# Patient Record
Sex: FEMALE | Race: WHITE | NOT HISPANIC OR LATINO | Employment: FULL TIME | URBAN - METROPOLITAN AREA
[De-identification: names, ages, dates, MRNs, and addresses within clinical notes are randomized per-mention and may not be internally consistent; named-entity substitution may affect disease eponyms.]

---

## 2021-12-31 ENCOUNTER — OFFICE VISIT (OUTPATIENT)
Dept: URGENT CARE | Facility: URGENT CARE | Age: 31
End: 2021-12-31
Payer: COMMERCIAL

## 2021-12-31 VITALS
RESPIRATION RATE: 14 BRPM | HEART RATE: 98 BPM | TEMPERATURE: 98.1 F | SYSTOLIC BLOOD PRESSURE: 104 MMHG | DIASTOLIC BLOOD PRESSURE: 62 MMHG | OXYGEN SATURATION: 98 %

## 2021-12-31 DIAGNOSIS — J45.901 EXACERBATION OF ASTHMA, UNSPECIFIED ASTHMA SEVERITY, UNSPECIFIED WHETHER PERSISTENT: ICD-10-CM

## 2021-12-31 DIAGNOSIS — J06.9 VIRAL URI WITH COUGH: Primary | ICD-10-CM

## 2021-12-31 DIAGNOSIS — R05.9 COUGH: ICD-10-CM

## 2021-12-31 DIAGNOSIS — R06.02 SHORTNESS OF BREATH: ICD-10-CM

## 2021-12-31 DIAGNOSIS — R06.2 WHEEZING: ICD-10-CM

## 2021-12-31 DIAGNOSIS — R09.81 NASAL CONGESTION: ICD-10-CM

## 2021-12-31 LAB
FLUAV AG SPEC QL IA: NEGATIVE
FLUBV AG SPEC QL IA: NEGATIVE

## 2021-12-31 PROCEDURE — 99204 OFFICE O/P NEW MOD 45 MIN: CPT | Performed by: PHYSICIAN ASSISTANT

## 2021-12-31 PROCEDURE — 87804 INFLUENZA ASSAY W/OPTIC: CPT | Performed by: PHYSICIAN ASSISTANT

## 2021-12-31 PROCEDURE — U0005 INFEC AGEN DETEC AMPLI PROBE: HCPCS | Performed by: PHYSICIAN ASSISTANT

## 2021-12-31 PROCEDURE — U0003 INFECTIOUS AGENT DETECTION BY NUCLEIC ACID (DNA OR RNA); SEVERE ACUTE RESPIRATORY SYNDROME CORONAVIRUS 2 (SARS-COV-2) (CORONAVIRUS DISEASE [COVID-19]), AMPLIFIED PROBE TECHNIQUE, MAKING USE OF HIGH THROUGHPUT TECHNOLOGIES AS DESCRIBED BY CMS-2020-01-R: HCPCS | Performed by: PHYSICIAN ASSISTANT

## 2021-12-31 RX ORDER — ALBUTEROL SULFATE 90 UG/1
1-2 AEROSOL, METERED RESPIRATORY (INHALATION)
COMMUNITY
Start: 2021-08-03

## 2021-12-31 RX ORDER — ALBUTEROL SULFATE 0.83 MG/ML
2.5 SOLUTION RESPIRATORY (INHALATION)
COMMUNITY
Start: 2021-08-03 | End: 2022-08-03

## 2021-12-31 RX ORDER — TOPIRAMATE 25 MG/1
75 TABLET, FILM COATED ORAL
COMMUNITY
Start: 2021-01-25 | End: 2022-01-25

## 2021-12-31 RX ORDER — PREDNISONE 20 MG/1
40 TABLET ORAL DAILY
Qty: 10 TABLET | Refills: 0 | Status: SHIPPED | OUTPATIENT
Start: 2021-12-31 | End: 2022-01-05

## 2021-12-31 RX ORDER — LEVONORGESTREL AND ETHINYL ESTRADIOL 0.15-0.03
1 KIT ORAL DAILY
COMMUNITY
Start: 2021-10-19

## 2021-12-31 RX ORDER — TRAZODONE HYDROCHLORIDE 100 MG/1
TABLET ORAL
COMMUNITY
Start: 2021-06-09

## 2021-12-31 NOTE — PATIENT INSTRUCTIONS
Patient Education     Viral Upper Respiratory Illness with Wheezing (Adult)    You have a viral upper respiratory illness (URI), which is another term for the common cold. When the infection causes a lot of irritation, the air passages can go into spasm. This causes wheezing and shortness of breath.  This illness is contagious during the first few days. It is spread through the air by coughing and sneezing. It may also be spread by direct contact. This could be by touching the sick person and then touching your own eyes, nose, or mouth. Frequent handwashing will decrease the risk.  Most viral illnesses go away within 7 to 10 days with rest and simple home remedies. Sometimes the illness may last for several weeks. Antibiotics will not kill a virus, and they are generally not prescribed for this condition.  Home care    If symptoms are severe, rest at home for the first 2 to 3 days. When you resume activity, don't let yourself get too tired.    If you smoke, stop. Ask your healthcare provider if you need help.    Stay away from secondhand cigarette smoke. Don't let people smoke in your house or car.    You may use acetaminophen or ibuprofen to control pain and fever, unless another medicine was prescribed. Take the medicine only as directed on the label. If you have chronic liver or kidney disease, have ever had a stomach ulcer or gastrointestinal bleeding, or are taking blood-thinning medicines, talk with your healthcare provider before using these medicines. Aspirin should never be given to anyone under 18 years of age who is ill with a viral infection or fever. It may cause severe liver or brain damage.    Your appetite may be poor, so a light diet is fine. Stay well hydrated by drinking 6 to 8 glasses of fluids per day (water, soft drinks, juices, tea, or soup). Extra fluids will help loosen secretions in the nose and lungs.    Over-the-counter cold medicines will not shorten the length of time you re sick, but  they may be helpful for the following symptoms: cough, sore throat, and nasal and sinus congestion. Ask your healthcare provider or pharmacist which over-the-counter medicine to use. Don't use decongestants if you have high blood pressure.  Follow-up care  Follow up with your healthcare provider, or as advised.  When to seek medical advice  Call your healthcare provider right away if any of these occur:    Cough with lots of colored sputum (mucus)    Severe headache; face, neck, or ear pain    Difficulty swallowing due to throat pain    Fever of 100.4 F (38 C) or higher, or as directed by your healthcare provider  Call 911  Call 911 if any of these occur:    Chest pain, shortness of breath, worsening wheezing, or difficulty breathing    Coughing up blood    Very severe pain when swallowing, especially if it goes along with a muffled voice  Dannielle last reviewed this educational content on 6/1/2018 2000-2021 [employer]. All rights reserved. This information is not intended as a substitute for professional medical care. Always follow your healthcare professional's instructions.                     December 31, 2021 [facility] Plan:      You have been diagnosed with a viral upper respiratory infection and asthma exacerbation.       Your Influenza A and B screening test result was negative/normal here today.     You may have a common cold virus. It is also possible you could have a Covid-19 virus.      A Covid-19 PCR test was done here today.  The result typically comes back in 1-2 days (watch your MyChart or call the urgent care clinic for your result in 1-2 days).     I prescribed a refill of your daily asthma prevention mediation (Advair) for you today. I also prescribed a 5 day course of prednisone for your asthma exacerbation. You should continue to use your Albuterol Nebs or hand held Albuterol MDI.     If your symptoms do not improve over the next 24-48 hours with treatment provided here  today, you should be re-evaluated by your primary care provider. If your symptoms worsen over the holiday weekend, go to the emergency room.        Please continue with home comfort care measures (including as needed Tylenol or Ibuprofen for sore throat and fever) and extra rest and fluids.     Follow-up immediately if you have any sudden, severe, worsening of your symptoms or if you develop any of the below:         Chest pain, shortness of breath, wheezing, or difficulty breathing    Coughing up blood    Very severe pain with swallowing, especially if it goes along with a muffled voice        Please stay home/self-isolate (no contact with others outside of home) until your Covid-19 test result is back (this typically takes 1-2 days), you are without fever for 24 hours (without use of fever reducing medication) and your symptoms are improving.       Please contact your work supervisor regarding their return to work policy when you have acute illness symptoms and you have had a Covid-19 test (with result pending).

## 2021-12-31 NOTE — PROGRESS NOTES
ASSESSMENT/PLAN:         (J06.9) Viral URI with cough  (primary encounter diagnosis)    MDM: Acute onset viral URI with associated asthma exacerbation/mild respiratory distress. Influenza screening negative. Covid-19 PCR pending. No evidence of secondary bacterial infection. No evidence of severe respiratory distress (good response to Albuterol MDI during visit) or other medical distress requiring immediate ER evaluation or hospitalization on history and exam here today. I have advised low threshold for immediate follow-up if any worsening of current symptoms and short interval follow-up if no improvement in next 24-48 hours. Please see below patient discharge summary (which I reviewed with her personally and provided in printed form for home review).     Plan:         December 31, 2021 [facility] Plan:      You have been diagnosed with a viral upper respiratory infection and asthma exacerbation.       Your Influenza A and B screening test result was negative/normal here today.     You may have a common cold virus. It is also possible you could have a Covid-19 virus.      A Covid-19 PCR test was done here today.  The result typically comes back in 1-2 days (watch your MyChart or call the urgent care clinic for your result in 1-2 days).     I prescribed a refill of your daily asthma prevention mediation (Advair) for you today. I also prescribed a 5 day course of prednisone for your asthma exacerbation. You should continue to use your Albuterol Nebs or hand held Albuterol MDI.     If your symptoms do not improve over the next 24-48 hours with treatment provided here today, you should be re-evaluated by your primary care provider. If your symptoms worsen over the holiday weekend, go to the emergency room.        Please continue with home comfort care measures (including as needed Tylenol or Ibuprofen for sore throat and fever) and extra rest and fluids.     Follow-up immediately if you have any sudden,  severe, worsening of your symptoms or if you develop any of the below:         Chest pain, shortness of breath, wheezing, or difficulty breathing    Coughing up blood    Very severe pain with swallowing, especially if it goes along with a muffled voice        Please stay home/self-isolate (no contact with others outside of home) until your Covid-19 test result is back (this typically takes 1-2 days), you are without fever for 24 hours (without use of fever reducing medication) and your symptoms are improving.       Please contact your work supervisor regarding their return to work policy when you have acute illness symptoms and you have had a Covid-19 test (with result pending).       (J45.905) Exacerbation of asthma, unspecified asthma severity, unspecified whether persistent  Plan: predniSONE (DELTASONE) 20 MG tablet, ADVAIR         DISKUS 100-50 MCG/DOSE inhaler    (R09.81) Nasal congestion  Plan: Influenza A & B Antigen - Clinic Collect    (R05.9) Cough    (R06.2) Wheezing    (R06.02) Shortness of breath    ---------------------------------------------        SUBJECTIVE:     Aliza Montgomery a very pleasant 31 year old female, with a history of asthma, presenting to  today for evaluation of acute onset cough, nasal congestion x several days, followed by  exacerbation of baseline asthma today. Patient states she has been using her Albuterol rescue nebs (states nebs tend to work better for her during exacerbation), but only gets short interval relief (estimated about 30-60 minutes of relief).     No severe shortness of breath (still able to do all ADL's). No coughing up of blood. No blue lips, fingers or toes. No history of severe asthma exacerbations requiring hospitalizations. Patient states she has needed prednisone in past when she has felt similar to how she feels now.        Illness Contact: No known close contact Influenza,  Covid-19 or other illness exposure         ROS: No associated fever,  chills, cough, shortness of breath, wheezing, sore throat, abdominal pain, nausea, vomiting, diarrhea, body aches, headaches, rashes, joint swelling or other acute illness symptoms.     There is no problem list on file for this patient.      Current Outpatient Medications   Medication     ADVAIR DISKUS 100-50 MCG/DOSE inhaler     albuterol (PROAIR HFA/PROVENTIL HFA/VENTOLIN HFA) 108 (90 Base) MCG/ACT inhaler     albuterol (PROVENTIL) (2.5 MG/3ML) 0.083% neb solution     etonogestrel (NEXPLANON) 68 MG IMPL     levonorgestrel-ethinyl estradiol (SEASONALE) 0.15-0.03 MG tablet     topiramate (TOPAMAX) 25 MG tablet     traZODone (DESYREL) 100 MG tablet     No current facility-administered medications for this visit.         Allergies   Allergen Reactions     Bacitracin-Neomycin-Polymyxin      PN: LW Reaction: RASH     Hydrocortisone      Patient states she tolerates oral prednisone for asthma exacerbations without side effects.      Sulfa Drugs      PN: LW Reaction: RASH         OBJECTIVE:  /62   Pulse 98   Temp 98.1  F (36.7  C) (Tympanic)   Resp 14   SpO2 98%   Breastfeeding No          General appearance: alert and no apparent distress  Skin color is pink and without rash.  HEENT:   Conjunctiva not injected.  Sclera clear.  Left TM is normal: no effusions, no erythema, and normal landmarks.  Right TM is normal: no effusions, no erythema, and normal landmarks.  Nasal mucosa is congested  Oropharyngeal exam is positive for post-nasal drip. No erythema.  No asymmetry. Uvula is midline. No trismus. Voice is clear. No lesions, adenopathy, plaque or exudate.  Neck is supple, FROM. No neck stiffness. No adenopathy  CARDIAC:NORMAL - regular rate and rhythm without murmur.  RESP: Positive for increased work of breathing. Initially, patient has to pause when talking. I directed her to use 2 Puffs of her own Albuterol Inhaler while in clinic, followed by 2 more puffs 15 minutes later, and increased work of breathing  at rest resolved. Positive for scattered wheezes. No rales or rhonchi. Still moving air well into all listening areas bilaterally.   NEURO: Alert and oriented.  Normal speech and mentation.  CN II/XII grossly intact.  Gait within normal limits.          LAB:      Results for orders placed or performed in visit on 12/31/21   Influenza A/B antigen     Status: Normal    Specimen: Nasopharyngeal; Swab   Result Value Ref Range    Influenza A antigen Negative Negative    Influenza B antigen Negative Negative    Narrative    Test results must be correlated with clinical data. If necessary, results should be confirmed by a molecular assay or viral culture.          Covid-19 PCR Test Result is Pending

## 2022-01-01 LAB — SARS-COV-2 RNA RESP QL NAA+PROBE: NEGATIVE

## 2022-11-12 ENCOUNTER — OFFICE VISIT (OUTPATIENT)
Dept: URGENT CARE | Facility: URGENT CARE | Age: 32
End: 2022-11-12
Payer: COMMERCIAL

## 2022-11-12 VITALS
DIASTOLIC BLOOD PRESSURE: 83 MMHG | WEIGHT: 234 LBS | HEART RATE: 78 BPM | TEMPERATURE: 98.7 F | OXYGEN SATURATION: 99 % | SYSTOLIC BLOOD PRESSURE: 122 MMHG

## 2022-11-12 DIAGNOSIS — J06.9 VIRAL URI WITH COUGH: Primary | ICD-10-CM

## 2022-11-12 DIAGNOSIS — R05.1 ACUTE COUGH: ICD-10-CM

## 2022-11-12 DIAGNOSIS — R07.0 THROAT PAIN: ICD-10-CM

## 2022-11-12 LAB
DEPRECATED S PYO AG THROAT QL EIA: NEGATIVE
FLUAV AG SPEC QL IA: NEGATIVE
FLUBV AG SPEC QL IA: NEGATIVE

## 2022-11-12 PROCEDURE — 99213 OFFICE O/P EST LOW 20 MIN: CPT | Performed by: PHYSICIAN ASSISTANT

## 2022-11-12 PROCEDURE — 87804 INFLUENZA ASSAY W/OPTIC: CPT | Performed by: PHYSICIAN ASSISTANT

## 2022-11-12 PROCEDURE — 87651 STREP A DNA AMP PROBE: CPT | Performed by: PHYSICIAN ASSISTANT

## 2022-11-12 RX ORDER — BENZONATATE 200 MG/1
200 CAPSULE ORAL 3 TIMES DAILY PRN
Qty: 30 CAPSULE | Refills: 0 | Status: SHIPPED | OUTPATIENT
Start: 2022-11-12

## 2022-11-12 NOTE — PROGRESS NOTES
Assessment & Plan     Acute cough  - Influenza A & B Antigen - Clinic Collect    Throat pain  - Streptococcus A Rapid Screen w/Reflex to PCR - Clinic Collect  - Group A Streptococcus PCR Throat Swab    Viral URI with cough  - benzonatate (TESSALON) 200 MG capsule  Dispense: 30 capsule; Refill: 0       Pt presents with cough, congestion, shortness of breath and nausea that began four days ago. Influenza and strep tests are negative, pt has taken numerous home COVID tests that have also been negative. Pt has asthma, is currently using advair inhaler but not albuterol inhaler. Pt will begin tessalon for cough and utilize albuterol nebulizer and inhaler for shortness of breath. Pt reports occasional wheezing at night, wheezes not appreciated on exam. Pt does not feel that she needs prednisone course at this time. She will follow up if shortness of breath increases or symptoms worsen.       Saira Phillips PA-C  [facility] Abrams is a 32 year old female who presents to clinic today for the following health issues:  Chief Complaint   Patient presents with     Shortness of Breath     Cough     Started on Tuesday     Headache     Nasal Congestion     HPI    URI Adult    Onset of symptoms was 4 day(s) ago.  Course of illness is waxing and waning.    Severity moderate  Current and Associated symptoms: chills, stuffy nose, cough - non-productive, shortness of breath, sore throat, headache, fatigue, nausea, vomiting, body aches and reflux  Treatment measures tried include Tylenol/Ibuprofen, Fluids and Rest.  Predisposing factors include HX of asthma.  Has been using advair inhaler, has albuterol inhaler but is not using it   Notices wheezing when laying down       No past medical history on file.  Current Outpatient Medications   Medication     benzonatate (TESSALON) 200 MG capsule     ADVAIR DISKUS 100-50 MCG/DOSE inhaler     albuterol (PROAIR HFA/PROVENTIL HFA/VENTOLIN HFA)  108 (90 Base) MCG/ACT inhaler     albuterol (PROVENTIL) (2.5 MG/3ML) 0.083% neb solution     etonogestrel (NEXPLANON) 68 MG IMPL     levonorgestrel-ethinyl estradiol (SEASONALE) 0.15-0.03 MG tablet     topiramate (TOPAMAX) 25 MG tablet     traZODone (DESYREL) 100 MG tablet     No current facility-administered medications for this visit.     Social History     Socioeconomic History     Marital status: Single     Spouse name: Not on file     Number of children: Not on file     Years of education: Not on file     Highest education level: Not on file   Occupational History     Not on file   Tobacco Use     Smoking status: Every Day     Smokeless tobacco: Never   Substance and Sexual Activity     Alcohol use: Not on file     Drug use: Not on file     Sexual activity: Not on file   Other Topics Concern     Not on file   Social History Narrative     Not on file     Social Determinants of Health     Financial Resource Strain: Not on file   Food Insecurity: Not on file   Transportation Needs: Not on file   Physical Activity: Not on file   Stress: Not on file   Social Connections: Not on file   Intimate Partner Violence: Not on file   Housing Stability: Not on file         Review of Systems  Detailed as above       Objective    /83   Pulse 78   Temp 98.7  F (37.1  C) (Oral)   Wt 106.1 kg (234 lb)   SpO2 99%   Physical Exam  HENT:      Right Ear: Tympanic membrane normal.      Left Ear: Tympanic membrane normal.      Nose: Congestion present.      Right Sinus: No maxillary sinus tenderness or frontal sinus tenderness.      Left Sinus: No maxillary sinus tenderness or frontal sinus tenderness.      Mouth/Throat:      Pharynx: Posterior oropharyngeal erythema present. No oropharyngeal exudate.   Cardiovascular:      Rate and Rhythm: Normal rate and regular rhythm.      Heart sounds: Normal heart sounds.   Pulmonary:      Effort: Pulmonary effort is normal.      Breath sounds: Normal breath sounds. No wheezing.    Musculoskeletal:         General: Normal range of motion.   Skin:     General: Skin is warm and dry.   Neurological:      Mental Status: She is alert.   Psychiatric:         Mood and Affect: Mood normal.        Results for orders placed or performed in visit on 11/12/22   Influenza A & B Antigen - Clinic Collect     Status: Normal    Specimen: Nasopharyngeal; Swab   Result Value Ref Range    Influenza A antigen Negative Negative    Influenza B antigen Negative Negative    Narrative    Test results must be correlated with clinical data. If necessary, results should be confirmed by a molecular assay or viral culture.   Streptococcus A Rapid Screen w/Reflex to PCR - Clinic Collect     Status: Normal    Specimen: Throat; Swab   Result Value Ref Range    Group A Strep antigen Negative Negative

## 2022-11-13 LAB — GROUP A STREP BY PCR: NOT DETECTED

## 2022-12-09 ENCOUNTER — OFFICE VISIT (OUTPATIENT)
Dept: URGENT CARE | Facility: URGENT CARE | Age: 32
End: 2022-12-09
Payer: COMMERCIAL

## 2022-12-09 VITALS
OXYGEN SATURATION: 96 % | TEMPERATURE: 98 F | SYSTOLIC BLOOD PRESSURE: 124 MMHG | HEART RATE: 74 BPM | DIASTOLIC BLOOD PRESSURE: 80 MMHG | RESPIRATION RATE: 18 BRPM

## 2022-12-09 DIAGNOSIS — J45.901 MODERATE ASTHMA WITH ACUTE EXACERBATION, UNSPECIFIED WHETHER PERSISTENT: Primary | ICD-10-CM

## 2022-12-09 PROCEDURE — 99214 OFFICE O/P EST MOD 30 MIN: CPT | Performed by: PHYSICIAN ASSISTANT

## 2022-12-09 RX ORDER — PREDNISONE 20 MG/1
TABLET ORAL
COMMUNITY
Start: 2022-12-05

## 2022-12-09 RX ORDER — AZITHROMYCIN 250 MG/1
TABLET, FILM COATED ORAL
Qty: 6 TABLET | Refills: 0 | Status: SHIPPED | OUTPATIENT
Start: 2022-12-09

## 2022-12-09 RX ORDER — PREDNISONE 20 MG/1
40 TABLET ORAL DAILY
Qty: 4 TABLET | Refills: 0 | Status: SHIPPED | OUTPATIENT
Start: 2022-12-09 | End: 2022-12-11

## 2022-12-09 RX ORDER — FLUTICASONE PROPIONATE AND SALMETEROL 100; 50 UG/1; UG/1
1 POWDER RESPIRATORY (INHALATION) 2 TIMES DAILY
Qty: 14 EACH | Refills: 0 | Status: SHIPPED | OUTPATIENT
Start: 2022-12-09 | End: 2022-12-14

## 2022-12-09 RX ORDER — ACETAMINOPHEN AND CODEINE PHOSPHATE 120; 12 MG/5ML; MG/5ML
SOLUTION ORAL
COMMUNITY
Start: 2022-10-14

## 2022-12-09 NOTE — PROGRESS NOTES
Assessment & Plan     1. Moderate asthma with acute exacerbation, unspecified whether persistent  Will add on azithromycin for asthma exacerbation  Continue with prednisone burst, if still having tightness in the chest, extend for 2 more days.  Refill of advair given  Supportive cares discussed   Discussed indications for follow-up   - fluticasone-salmeterol (ADVAIR) 100-50 MCG/ACT inhaler; Inhale 1 puff into the lungs 2 times daily  Dispense: 14 each; Refill: 0  - azithromycin (ZITHROMAX) 250 MG tablet; Two tablets first day, then one tablet daily for four days.  Dispense: 6 tablet; Refill: 0  - predniSONE (DELTASONE) 20 MG tablet; Take 2 tablets (40 mg) by mouth daily for 2 days  Dispense: 4 tablet; Refill: 0        Return in about 3 days (around 12/12/2022), or if symptoms worsen or fail to improve.    Diagnosis and treatment plan was reviewed with patient and/or family.   We went over any labs or imaging. Discussed worsening symptoms or little to no relief despite treatment plan to follow-up with PCP or return to clinic.  Patient verbalizes understanding. All questions were addressed and answered.     Cristel Gordillo PA-C  [facility]    CHIEF COMPLAINT:   Chief Complaint   Patient presents with     Cough     Cough/wheezing pt has asthma      Subjective     Aliza is a 32 year old female who presents to clinic today for evaluation of asthma exacerbation. Started 5 days ago. Started on prednisone burst 4 days ago. Not seeing much improvement. Using her albuterol inhaler. No chest pain.   At home covid test negative.       History reviewed. No pertinent past medical history.  History reviewed. No pertinent surgical history.  Social History     Tobacco Use     Smoking status: Every Day     Smokeless tobacco: Never   Substance Use Topics     Alcohol use: Not on file     Current Outpatient Medications   Medication     albuterol (PROAIR HFA/PROVENTIL HFA/VENTOLIN HFA) 108 (90 Base)  MCG/ACT inhaler     azithromycin (ZITHROMAX) 250 MG tablet     benzonatate (TESSALON) 200 MG capsule     etonogestrel (NEXPLANON) 68 MG IMPL     fluticasone-salmeterol (ADVAIR) 100-50 MCG/ACT inhaler     levonorgestrel-ethinyl estradiol (SEASONALE) 0.15-0.03 MG tablet     predniSONE (DELTASONE) 20 MG tablet     traZODone (DESYREL) 100 MG tablet     ADVAIR DISKUS 100-50 MCG/DOSE inhaler     albuterol (PROVENTIL) (2.5 MG/3ML) 0.083% neb solution     norethindrone (MICRONOR) 0.35 MG tablet     predniSONE (DELTASONE) 20 MG tablet     topiramate (TOPAMAX) 25 MG tablet     No current facility-administered medications for this visit.     Allergies   Allergen Reactions     Bacitracin-Neomycin-Polymyxin      PN: LW Reaction: RASH     Hydrocortisone      Patient states she tolerates oral prednisone for asthma exacerbations without side effects.      Sulfa Drugs      PN: LW Reaction: RASH       10 point ROS of systems were all negative except for pertinent positives noted in my HPI.      Exam:   /80   Pulse 74   Temp 98  F (36.7  C)   Resp 18   SpO2 96%   Constitutional: healthy, alert and no distress  Head: Normocephalic, atraumatic.  Eyes: conjunctiva clear, no drainage  ENT: TMs clear and shiny tracie, nasal mucosa pink and moist, throat without tonsillar hypertrophy or erythema  Neck: neck is supple, no cervical lymphadenopathy or nuchal rigidity  Cardiovascular: RRR  Respiratory: Faint end expiratory wheezing.   Skin: no rashes  Neurologic: Speech clear, gait normal. Moves all extremities.    No results found for any visits on 12/09/22.

## 2022-12-09 NOTE — PATIENT INSTRUCTIONS
Start taking azithromycin  If you are still having chest tightness on Sun, OK to continue prednisone burst for 2 more days  Continue with albuterol  Use advair to prevent asthma exacerbations in the future  Mucinex for cough  Fluids, rest and humidified air at night

## 2025-07-07 ENCOUNTER — HOSPITAL ENCOUNTER (EMERGENCY)
Facility: CLINIC | Age: 35
Discharge: HOME | End: 2025-07-08
Payer: COMMERCIAL

## 2025-07-07 ENCOUNTER — APPOINTMENT (OUTPATIENT)
Dept: CT IMAGING | Facility: CLINIC | Age: 35
End: 2025-07-07
Payer: COMMERCIAL

## 2025-07-07 DIAGNOSIS — R13.10 DYSPHAGIA, UNSPECIFIED TYPE: ICD-10-CM

## 2025-07-07 DIAGNOSIS — R09.89 THROAT TIGHTNESS: ICD-10-CM

## 2025-07-07 LAB
ANION GAP SERPL CALCULATED.3IONS-SCNC: 14 MMOL/L (ref 7–15)
ATRIAL RATE - MUSE: 55 BPM
BASOPHILS # BLD AUTO: 0.1 10E3/UL (ref 0–0.2)
BASOPHILS NFR BLD AUTO: 1 %
BUN SERPL-MCNC: 6.7 MG/DL (ref 6–20)
CALCIUM SERPL-MCNC: 9.6 MG/DL (ref 8.8–10.4)
CHLORIDE SERPL-SCNC: 102 MMOL/L (ref 98–107)
CREAT SERPL-MCNC: 0.97 MG/DL (ref 0.51–0.95)
DIASTOLIC BLOOD PRESSURE - MUSE: NORMAL MMHG
EGFRCR SERPLBLD CKD-EPI 2021: 78 ML/MIN/1.73M2
EOSINOPHIL # BLD AUTO: 0.4 10E3/UL (ref 0–0.7)
EOSINOPHIL NFR BLD AUTO: 3 %
ERYTHROCYTE [DISTWIDTH] IN BLOOD BY AUTOMATED COUNT: 13.2 % (ref 10–15)
GLUCOSE SERPL-MCNC: 89 MG/DL (ref 70–99)
HCG SERPL QL: NEGATIVE
HCO3 SERPL-SCNC: 23 MMOL/L (ref 22–29)
HCT VFR BLD AUTO: 42.8 % (ref 35–47)
HGB BLD-MCNC: 14.4 G/DL (ref 11.7–15.7)
IMM GRANULOCYTES # BLD: 0.1 10E3/UL
IMM GRANULOCYTES NFR BLD: 1 %
INTERPRETATION ECG - MUSE: NORMAL
LYMPHOCYTES # BLD AUTO: 2.7 10E3/UL (ref 0.8–5.3)
LYMPHOCYTES NFR BLD AUTO: 21 %
MCH RBC QN AUTO: 30.2 PG (ref 26.5–33)
MCHC RBC AUTO-ENTMCNC: 33.6 G/DL (ref 31.5–36.5)
MCV RBC AUTO: 90 FL (ref 78–100)
MONOCYTES # BLD AUTO: 0.7 10E3/UL (ref 0–1.3)
MONOCYTES NFR BLD AUTO: 6 %
NEUTROPHILS # BLD AUTO: 8.8 10E3/UL (ref 1.6–8.3)
NEUTROPHILS NFR BLD AUTO: 69 %
NRBC # BLD AUTO: 0 10E3/UL
NRBC BLD AUTO-RTO: 0 /100
P AXIS - MUSE: 66 DEGREES
PLATELET # BLD AUTO: 307 10E3/UL (ref 150–450)
POTASSIUM SERPL-SCNC: 3.5 MMOL/L (ref 3.4–5.3)
PR INTERVAL - MUSE: 170 MS
QRS DURATION - MUSE: 88 MS
QT - MUSE: 414 MS
QTC - MUSE: 396 MS
R AXIS - MUSE: 59 DEGREES
RBC # BLD AUTO: 4.77 10E6/UL (ref 3.8–5.2)
SODIUM SERPL-SCNC: 139 MMOL/L (ref 135–145)
SYSTOLIC BLOOD PRESSURE - MUSE: NORMAL MMHG
T AXIS - MUSE: 55 DEGREES
VENTRICULAR RATE- MUSE: 55 BPM
WBC # BLD AUTO: 12.8 10E3/UL (ref 4–11)

## 2025-07-07 PROCEDURE — 36415 COLL VENOUS BLD VENIPUNCTURE: CPT

## 2025-07-07 PROCEDURE — 93005 ELECTROCARDIOGRAM TRACING: CPT

## 2025-07-07 PROCEDURE — 70491 CT SOFT TISSUE NECK W/DYE: CPT

## 2025-07-07 PROCEDURE — 85004 AUTOMATED DIFF WBC COUNT: CPT

## 2025-07-07 PROCEDURE — 84703 CHORIONIC GONADOTROPIN ASSAY: CPT

## 2025-07-07 PROCEDURE — 250N000011 HC RX IP 250 OP 636

## 2025-07-07 PROCEDURE — 99285 EMERGENCY DEPT VISIT HI MDM: CPT | Mod: 25

## 2025-07-07 PROCEDURE — 80048 BASIC METABOLIC PNL TOTAL CA: CPT

## 2025-07-07 RX ORDER — IOPAMIDOL 755 MG/ML
90 INJECTION, SOLUTION INTRAVASCULAR ONCE
Status: DISCONTINUED | OUTPATIENT
Start: 2025-07-07 | End: 2025-07-07

## 2025-07-07 RX ORDER — ONDANSETRON 4 MG/1
4 TABLET, ORALLY DISINTEGRATING ORAL EVERY 6 HOURS PRN
Qty: 10 TABLET | Refills: 0 | Status: SHIPPED | OUTPATIENT
Start: 2025-07-07

## 2025-07-07 RX ORDER — ONDANSETRON 4 MG/1
4 TABLET, ORALLY DISINTEGRATING ORAL EVERY 6 HOURS PRN
Status: DISCONTINUED | OUTPATIENT
Start: 2025-07-07 | End: 2025-07-08 | Stop reason: HOSPADM

## 2025-07-07 RX ORDER — IOPAMIDOL 755 MG/ML
90 INJECTION, SOLUTION INTRAVASCULAR ONCE
Status: COMPLETED | OUTPATIENT
Start: 2025-07-07 | End: 2025-07-07

## 2025-07-07 RX ADMIN — ONDANSETRON 4 MG: 4 TABLET, ORALLY DISINTEGRATING ORAL at 21:51

## 2025-07-07 RX ADMIN — IOPAMIDOL 90 ML: 755 INJECTION, SOLUTION INTRAVENOUS at 23:06

## 2025-07-07 ASSESSMENT — ACTIVITIES OF DAILY LIVING (ADL)
ADLS_ACUITY_SCORE: 41
ADLS_ACUITY_SCORE: 41

## 2025-07-07 ASSESSMENT — COLUMBIA-SUICIDE SEVERITY RATING SCALE - C-SSRS
1. IN THE PAST MONTH, HAVE YOU WISHED YOU WERE DEAD OR WISHED YOU COULD GO TO SLEEP AND NOT WAKE UP?: NO
6. HAVE YOU EVER DONE ANYTHING, STARTED TO DO ANYTHING, OR PREPARED TO DO ANYTHING TO END YOUR LIFE?: NO
2. HAVE YOU ACTUALLY HAD ANY THOUGHTS OF KILLING YOURSELF IN THE PAST MONTH?: NO

## 2025-07-08 VITALS
BODY MASS INDEX: 37.61 KG/M2 | SYSTOLIC BLOOD PRESSURE: 155 MMHG | HEART RATE: 72 BPM | RESPIRATION RATE: 16 BRPM | DIASTOLIC BLOOD PRESSURE: 77 MMHG | TEMPERATURE: 98 F | OXYGEN SATURATION: 96 % | HEIGHT: 66 IN | WEIGHT: 234 LBS

## 2025-07-08 NOTE — DISCHARGE INSTRUCTIONS
Your emergency department evaluation is reassuring today.    Although I am unsure exactly what is causing your symptoms, they do not appear to be due to a cause require emergent intervention at this time.    Based on your history, story, and physical exam today I anticipate you need an endoscopy.  I have ordered this and they will be calling you to schedule this. If you don t hear from a representative within 2 business days, please call [phone], option 2.     You need to follow-up with your primary care provider within 1 week for recheck and further management.     Call [phone] anytime you think you may need emergency care. For example, call if:    You pass out (loses consciousness).     You have chest pain.     You vomit a large amount of blood or what looks like coffee grounds.     You have severe stomach pain.     You pass maroon or very bloody stools.     You can't swallow, even your own saliva.     You have severe trouble breathing.   Call your doctor now or seek immediate medical care if:    You have any stomach pain.     You have signs of an infection, such as:  Pain, swelling, or tenderness in or around your throat, neck, chest, or belly.  A fever.  A cough.  Shortness of breath.     You vomit a small amount of blood or what looks like coffee grounds.

## 2025-07-08 NOTE — ED TRIAGE NOTES
Around 1600 the PT developed throat pressure while sitting at work. PT states if she drinks water or eats rice it comes right back up.      Triage Assessment (Adult)       Row Name 07/07/25 2100          Triage Assessment    Airway WDL WDL        Respiratory WDL    Respiratory WDL WDL        Skin Circulation/Temperature WDL    Skin Circulation/Temperature WDL WDL        Cardiac WDL    Cardiac WDL WDL        Peripheral/Neurovascular WDL    Peripheral Neurovascular WDL WDL        Cognitive/Neuro/Behavioral WDL    Cognitive/Neuro/Behavioral WDL WDL

## 2025-07-08 NOTE — ED PROVIDER NOTES
Emergency Department Encounter  [facility] EMERGENCY ROOM  Aliza Francis   AGE: 35 year old SEX: female  YOB: 1990  PCP: Silvia Phoenix  MRN: [id number]      EVALUATION DATE & TIME: 7/7/2025  9:16 PM ; PCP: Silvia Phoenix   ED PROVIDER: Елена Meek PA-C    CHIEF COMPLAINT: Throat Discomfort and Emesis      FINAL IMPRESSION       ICD-10-CM    1. Throat tightness  R09.89 Adult GI  Referral - Procedure Only      2. Dysphagia, unspecified type  R13.10 Adult GI  Referral - Procedure Only          MEDICAL DECISION MAKING   35 year old female with a history of asthma and GERD on daily omeprazole presents to the ED for evaluation of throat discomfort and emesis that occurred midway throughout the day today.  Patient reports she initially was vomiting the liquid she had that morning after feeling a tightness in her throat.  Symptoms continued into the evening.  Was able to tolerate dinner with slow and small bites but vomited again.  Feels like she is having difficulty swallowing, especially when lying down.  No fevers, chills, or sore throat.    ED Course as of 07/08/25 0012   Mon Jul 07, 2025   2140 I met the patient and gathered initial history and performed my physical exam.  Vitals reviewed and hemodynamically stable, afebrile.  On exam, patient well-appearing and in no acute distress.  Oropharynx unremarkable.  No thyromegaly.  Plan for CT soft tissue neck.  Will proceed with blood work required for scan.  Also considered atypical presentation of ACS, EKG ordered.  Zofran given for symptomatic management.   2256 HCG Qualitative Serum: Negative   2256 WBC(!): 12.8  Slight elevation.   2257 Creatinine(!): 0.97  Near baseline.   0.85 on 06/05/2011 2352 Soft tissue neck CT w contrast  Normal soft tissue neck CT.   2352 We discussed results, discharge, and follow up. Patient tolerating oral intake. Questions were answered.       Medications given today in the emergency department:  Medications   ondansetron (ZOFRAN ODT) ODT tab 4 mg (4 mg Oral $Given 7/7/25 2159)   iopamidol (ISOVUE-370) solution 90 mL (90 mLs Intravenous $Given 7/7/25 7961)     Assessment/Plan: Unsure the exact cause of patient's throat tightness and dysphagia but based on evaluation today, symptoms not appear to be due to a cause require emergent intervention at this time.  CT soft tissue of the neck shows no mucosal mass with normal parapharyngeal space and subcutaneous soft tissues and patent neck vasculature.  Given acute onset, lower suspicion for degenerative diseases as cause.  Considered, but doubt based on evaluation and history today, foreign body, thyroid enlargement, streptococcal pharyngitis, rash, and peritonsillar abscess.  Considered acute abdominal process with patient without abdominal pain or tenderness on exam.  Also considered atypical presentation of ACS but EKG without ischemic changes.  Overall, no red flag s/s present to suggest an acutely serious or life threatening condition that would necessitate hospitalization.  Plan to discharge home.  Endoscopy order placed, patient advised to follow-up with primary care for further management indications for re-evaluation in the ER discussed.     New prescriptions started at today's ED visit:   New Prescriptions    ONDANSETRON (ZOFRAN ODT) 4 MG ODT TAB    Take 1 tablet (4 mg) by mouth every 6 hours as needed for nausea or vomiting.     Medical Decision Making      Discharge. I prescribed additional prescription strength medication(s) as charted. See documentation for any additional details.    MIPS (CTPE, Dental pain, David, Sinusitis, Asthma/COPD, Head Trauma): Not Applicable    SEPSIS: None    HISTORY OF PRESENT ILLNESS   Patient information was obtained from: patient    Use of Intrepreter: N/A     Aliza Rosangela Francis is a 35 year old female with a pertinent history of asthma and GERD who presents  to the ED by walk-in for evaluation of throat discomfort and vomiting.     Patient notes that this morning she didn't really have anything to eat. She had a [employer] drink, water, and vitamin water. She had a meeting in the afternoon today and throughout the meeting she started to feel nauseous and had a slight cough. Patient states she vomited at 3:30 PM today and the vomit was water and some bile. Patient states since then she has felt a discomfort in her throat and it is difficult for her to swallow her secretions and food. She states it does not feel like something is stuck in her throat. Patient then had rice at 6 PM very slow and small bites. She was able to swallow. Patient states she can swallow water as well. Patient has had multiple episodes of vomiting since this afternoon. Patient states no chance of pregnancy because, she has not had any sexual activity.     Patient denies fevers, chills, sore throat, general illness, or any other complaints at this time.     MEDICAL HISTORY   No past medical history on file.    No past surgical history on file.    No family history on file.    Social History     Tobacco Use    Smoking status: Every Day    Smokeless tobacco: Never       Most Recent Immunizations   Administered Date(s) Administered    COVID-19 Monovalent 18+ (Moderna) 12/28/2021        ondansetron (ZOFRAN ODT) 4 MG ODT tab  albuterol (PROAIR HFA/PROVENTIL HFA/VENTOLIN HFA) 108 (90 Base) MCG/ACT inhaler  azithromycin (ZITHROMAX) 250 MG tablet  benzonatate (TESSALON) 200 MG capsule  etonogestrel (NEXPLANON) 68 MG IMPL  fluticasone-salmeterol (ADVAIR) 100-50 MCG/ACT inhaler  levonorgestrel-ethinyl estradiol (SEASONALE) 0.15-0.03 MG tablet  norethindrone (MICRONOR) 0.35 MG tablet  predniSONE (DELTASONE) 20 MG tablet  topiramate (TOPAMAX) 25 MG tablet  traZODone (DESYREL) 100 MG tablet        PHYSICAL EXAM   VITALS:  Patient Vitals for the past 24 hrs:   BP Temp Temp src Pulse Resp SpO2 Height Weight  "  07/07/25 2102 -- 98  F (36.7  C) -- -- -- -- -- --   07/07/25 2058 (!) 171/67 -- Oral 78 17 96 % 1.676 m (5' 6\") 106.1 kg (234 lb)     Body mass index is 37.77 kg/m .     Vitals reviewed. Nursing notes reviewed.  CONSITUTIONAL: Generally well appearing female in no acute distress. Well developed and nourished.   EYES: Conjunctivae clear without exudates or hemorrhage. Sclera non-icteric. EOM grossly intact.   HENT: Normocephalic, atraumatic.  Moist mucous membranes. Oral cavity without lesions or nodules. Oropharynx without erythema, exudate or swelling. Uvula midline.   NECK: Supple, gross ROM intact. No mass or swelling.  RESPIRATORY: Unlabored respiratory effort, speaks in full sentences.   CARDIO: Normal heart rate, regular rhythm, no murmurs. No pedal edema.   GI: Soft, nondistended. Abdomen is non tender to palpation.  MSK: Moving all 4 extremities intentionally and without pain. No joint swelling, redness, or obvious deformity.  SKIN: Warm, dry. No rashes or lesions.  NEURO:  AxOx4. CN II-XII grossly intact, but not individually tested. No focal neurological deficits.   PSYCH: Thoughts linear and responses appropriate. Cooperative. Appropriate mood and affect.     LABS & IMAGING   All pertinent labs and imaging reviewed and interpreted.  Results for orders placed or performed during the hospital encounter of 07/07/25   Soft tissue neck CT w contrast    Impression    IMPRESSION:   1.  Normal soft tissue neck CT.   Basic metabolic panel   Result Value Ref Range    Sodium 139 135 - 145 mmol/L    Potassium 3.5 3.4 - 5.3 mmol/L    Chloride 102 98 - 107 mmol/L    Carbon Dioxide (CO2) 23 22 - 29 mmol/L    Anion Gap 14 7 - 15 mmol/L    Urea Nitrogen 6.7 6.0 - 20.0 mg/dL    Creatinine 0.97 (H) 0.51 - 0.95 mg/dL    GFR Estimate 78 >60 mL/min/1.73m2    Calcium 9.6 8.8 - 10.4 mg/dL    Glucose 89 70 - 99 mg/dL   HCG QUALitative pregnancy (blood)   Result Value Ref Range    hCG Serum Qualitative Negative Negative   CBC " with platelets and differential   Result Value Ref Range    WBC Count 12.8 (H) 4.0 - 11.0 10e3/uL    RBC Count 4.77 3.80 - 5.20 10e6/uL    Hemoglobin 14.4 11.7 - 15.7 g/dL    Hematocrit 42.8 35.0 - 47.0 %    MCV 90 78 - 100 fL    MCH 30.2 26.5 - 33.0 pg    MCHC 33.6 31.5 - 36.5 g/dL    RDW 13.2 10.0 - 15.0 %    Platelet Count 307 150 - 450 10e3/uL    % Neutrophils 69 %    % Lymphocytes 21 %    % Monocytes 6 %    % Eosinophils 3 %    % Basophils 1 %    % Immature Granulocytes 1 %    NRBCs per 100 WBC 0 <1 /100    Absolute Neutrophils 8.8 (H) 1.6 - 8.3 10e3/uL    Absolute Lymphocytes 2.7 0.8 - 5.3 10e3/uL    Absolute Monocytes 0.7 0.0 - 1.3 10e3/uL    Absolute Eosinophils 0.4 0.0 - 0.7 10e3/uL    Absolute Basophils 0.1 0.0 - 0.2 10e3/uL    Absolute Immature Granulocytes 0.1 <=0.4 10e3/uL    Absolute NRBCs 0.0 10e3/uL   ECG 12-Lead with MUSE - [facility]   Result Value Ref Range    Systolic Blood Pressure  mmHg    Diastolic Blood Pressure  mmHg    Ventricular Rate 55 BPM    Atrial Rate 55 BPM    AK Interval 170 ms    QRS Duration 88 ms     ms    QTc 396 ms    P Axis 66 degrees    R AXIS 59 degrees    T Axis 55 degrees    Interpretation ECG       Sinus bradycardia with sinus arrhythmia  Otherwise normal ECG  When compared with ECG of 05-Jun-2011 20:45,  MANUAL COMPARISON REQUIRED PREVIOUS ECG IS INCOMPATIBLE  Confirmed by SEE ED PROVIDER NOTE FOR, ECG INTERPRETATION ([id number]),  JORDAN LOVE ([id number]) on 7/7/2025 10:55:45 PM       ECG:  Performed at: [facility] EMERGENCY ROOM  Impression: Sinus bradycardia at 55 bpm without signs of ischemia or malignant arrhythmia.  Comparisons: No significant changes when compared to ECG June 5, 2011.    EKG was collected and independently interpreted by myself and also confirmed by Dr. Mays, ED MD.    IRene, am serving as a scribe to document services personally performed by Елена Meek PA-C, based on my observation and  the provider's statements to me. I, Елена Meek PA-C attest that Rene Loera is acting in a scribe capacity, has observed my performance of the services and has documented them in accordance with my direction.     Елена Meek PA-C   Emergency Medicine   [facility] EMERGENCY ROOM  [address] [facility] [id number] [phone]  Dept: [phone]     Елена Meek PA-C  07/08/25 0017       Елена Meek PA-C  07/08/25 0040